# Patient Record
Sex: FEMALE | Race: ASIAN | URBAN - METROPOLITAN AREA
[De-identification: names, ages, dates, MRNs, and addresses within clinical notes are randomized per-mention and may not be internally consistent; named-entity substitution may affect disease eponyms.]

---

## 2023-04-05 ENCOUNTER — OFFICE VISIT (OUTPATIENT)
Facility: LOCATION | Age: 58
End: 2023-04-05
Payer: MEDICARE

## 2023-04-05 DIAGNOSIS — H16.223 KERATOCONJUNCT SICCA, NOT SPECIFIED AS SJOGREN'S, BILATERAL: ICD-10-CM

## 2023-04-05 DIAGNOSIS — H25.813 COMBINED FORMS OF AGE-RELATED CATARACT, BILATERAL: Primary | ICD-10-CM

## 2023-04-05 DIAGNOSIS — H04.123 DRY EYE SYNDROME OF BILATERAL LACRIMAL GLANDS: ICD-10-CM

## 2023-04-05 PROCEDURE — 92025 CPTRIZED CORNEAL TOPOGRAPHY: CPT | Performed by: OPHTHALMOLOGY

## 2023-04-05 PROCEDURE — 92136 OPHTHALMIC BIOMETRY: CPT | Performed by: OPHTHALMOLOGY

## 2023-04-05 PROCEDURE — 92134 CPTRZ OPH DX IMG PST SGM RTA: CPT | Performed by: OPHTHALMOLOGY

## 2023-04-05 PROCEDURE — 92015 DETERMINE REFRACTIVE STATE: CPT | Performed by: OPHTHALMOLOGY

## 2023-04-05 PROCEDURE — 99204 OFFICE O/P NEW MOD 45 MIN: CPT | Performed by: OPHTHALMOLOGY

## 2023-04-05 RX ORDER — PREDNISOLONE ACETATE 10 MG/ML
1 % SUSPENSION/ DROPS OPHTHALMIC
Qty: 10 | Refills: 2 | Status: INACTIVE
Start: 2023-04-05 | End: 2023-04-05

## 2023-04-05 RX ORDER — KETOROLAC TROMETHAMINE 5 MG/ML
0.5 % SOLUTION OPHTHALMIC
Qty: 10 | Refills: 2 | Status: ACTIVE
Start: 2023-04-05

## 2023-04-05 RX ORDER — OFLOXACIN 3 MG/ML
0.3 % SOLUTION/ DROPS OPHTHALMIC
Qty: 10 | Refills: 2 | Status: ACTIVE
Start: 2023-04-05

## 2023-04-05 ASSESSMENT — VISUAL ACUITY
OD: 20/25
OS: 20/25

## 2023-04-05 ASSESSMENT — INTRAOCULAR PRESSURE
OD: 16
OS: 16

## 2023-04-05 NOTE — IMPRESSION/PLAN
Impression: Keratoconjunct sicca, not specified as Sjogren's, bilateral: H45.797. Plan: RTC in 1-2 weeks for pre-op and ext x4 with Dr. Raina Agarwal.

## 2023-04-05 NOTE — IMPRESSION/PLAN
Impression: Combined forms of age-related cataract, bilateral: H25.813. OD > OS  Plan: MAC OCT, Orbscan, ARGOS, and OPD ordered today. Discussed option of trifocal IOL which has an extended depth of focus that allows for distance, intermediate, and near vision due to the concentric rings in the IOL. Patient aware of glares/halos caused by the trifocal lens due to the concentric circles in the IOL giving it the extended depth of focus. Discussed option of blended vision where the dominant eye would be geared for distance and the other eye geared for intermediate and together they would blend and give the patient a range of vision. Patient aware they will need glasses to drive at night and wear reading glasses. Explained to pt that if she gets the trifocal IOL and she is too bothered by the glares/halos it causes, she does have the option of having the IOL replaced. Pt expresses understanding. Discussed R/B/A of cataract surgery including risk of bleeding, infection, decreased BCVA, loss of eye. No guarantees, possible need  for further surgery. Patient expressed good understanding and wishes to proceed with CE/IOL OD. Hold off on OS until patient is ready to proceed. Explained to patient that cataract surgery is a procedure performed on the eye and any procedure of the eye is known to cause or aggravate ocular surface disease and dry eye issues. Explained to patient that this can be a lifelong concern even without cataract surgery and the cost of this is not covered by the cost of cataract surgery. Patient expresses understanding. Patient leaning between cataract surgery + blended vision + FEMTO OU vs. cataract surgery + Synergy/PanOptix trifocal + FEMTO OU. Pt to decide at pre-op. RTC in 1 month for cataract surgery OD with Dr. Aimee Wu.

## 2023-04-19 ENCOUNTER — OFFICE VISIT (OUTPATIENT)
Facility: LOCATION | Age: 58
End: 2023-04-19
Payer: COMMERCIAL

## 2023-04-19 DIAGNOSIS — H16.223 KERATOCONJUNCT SICCA, NOT SPECIFIED AS SJOGREN'S, BILATERAL: ICD-10-CM

## 2023-04-19 DIAGNOSIS — H25.813 COMBINED FORMS OF AGE-RELATED CATARACT, BILATERAL: ICD-10-CM

## 2023-04-19 DIAGNOSIS — H04.123 DRY EYE SYNDROME OF BILATERAL LACRIMAL GLANDS: Primary | ICD-10-CM

## 2023-04-19 ASSESSMENT — INTRAOCULAR PRESSURE
OS: 16
OD: 15

## 2023-04-19 NOTE — IMPRESSION/PLAN
Impression: Combined forms of age-related cataract, bilateral: H25.813. Plan: Discussed R/B/A of cataract surgery including risk of bleeding, infection, decreased BCVA, loss of eye. No guarantees, possible need  for further surgery. Patient expressed good understanding and wishes to proceed with CE/IOL OD Patient wears contact lenses daily, advised pt to stay out of her contact lenses for 2 weeks to repeat Amber Render, [address] [facility] [place]. Dr. Moises Lim to review measurements. Patient is leaning towards femto laser OD Goal: [facility] RTC 2 week repeat Amber Render, [address] [place], re-evaluate with Dr. Moises Lim
Impression: Dry eye syndrome of bilateral lacrimal glands: H04.123. Placed extended plugs x 4 today. Plan: Continue Refresh Plus 6 x day.
Impression: Keratoconjunct sicca, not specified as Sjogren's, bilateral: Q18.467. Plan: see dry eye plan.
Detail Level: Detailed
Patient is having excised by Diana Deras PA-C on 9/19/2018.

## 2023-05-03 ENCOUNTER — OFFICE VISIT (OUTPATIENT)
Facility: LOCATION | Age: 58
End: 2023-05-03
Payer: COMMERCIAL

## 2023-05-03 DIAGNOSIS — H25.813 COMBINED FORMS OF AGE-RELATED CATARACT, BILATERAL: Primary | ICD-10-CM

## 2023-05-03 PROCEDURE — 92025 CPTRIZED CORNEAL TOPOGRAPHY: CPT | Performed by: OPHTHALMOLOGY

## 2023-05-03 PROCEDURE — 76519 ECHO EXAM OF EYE: CPT | Performed by: OPHTHALMOLOGY

## 2023-05-03 PROCEDURE — 99214 OFFICE O/P EST MOD 30 MIN: CPT | Performed by: OPHTHALMOLOGY

## 2023-05-03 ASSESSMENT — PACHYMETRY
OS: 3.01
OD: 3.02
OD: 23.63
OS: 23.29

## 2023-05-03 ASSESSMENT — INTRAOCULAR PRESSURE
OS: 15
OD: 17

## 2023-05-03 NOTE — IMPRESSION/PLAN
Impression: Combined forms of age-related cataract, bilateral: H25.813. Plan: Discussed R/B/A of cataract surgery including risk of bleeding, infection, decreased BCVA, loss of eye. No guarantees, possible need  for further surgery. Patient expressed good understanding and wishes to proceed with CE/IOL OD. Patient has elected FEMTO/LRI OD. No guarantees were given. Patient understands. Lens picked: Syble Patch +21.50 Goal: [facility] RTC for surgery.

## 2023-05-05 ENCOUNTER — POST-OPERATIVE VISIT (OUTPATIENT)
Facility: LOCATION | Age: 58
End: 2023-05-05
Payer: COMMERCIAL

## 2023-05-05 DIAGNOSIS — Z48.810 ENCOUNTER FOR SURGICAL AFTERCARE FOLLOWING SURGERY ON A SENSE ORGAN: Primary | ICD-10-CM

## 2023-05-05 PROCEDURE — 99024 POSTOP FOLLOW-UP VISIT: CPT | Performed by: OPHTHALMOLOGY

## 2023-05-05 ASSESSMENT — INTRAOCULAR PRESSURE: OS: 14

## 2023-05-05 NOTE — IMPRESSION/PLAN
Impression: S/P Cataract Extraction by phacoemulsification with IOL placement; Femto (LenSx) OD - . Encounter for surgical aftercare following surgery on a sense organ  Z48.810.  Plan: Continue Ofloxacin, Pred QID and PFATs Q2 hours WA
RTC 1 week for IOP check, MR, AR and re-evaluate with Dr. Anil Schuler

## 2023-05-10 ENCOUNTER — POST-OPERATIVE VISIT (OUTPATIENT)
Facility: LOCATION | Age: 58
End: 2023-05-10
Payer: COMMERCIAL

## 2023-05-10 DIAGNOSIS — Z96.1 PRESENCE OF INTRAOCULAR LENS: Primary | ICD-10-CM

## 2023-05-10 PROCEDURE — 99024 POSTOP FOLLOW-UP VISIT: CPT | Performed by: OPHTHALMOLOGY

## 2023-05-10 ASSESSMENT — INTRAOCULAR PRESSURE
OS: 16
OD: 15

## 2023-05-10 ASSESSMENT — VISUAL ACUITY: OD: 20/20

## 2023-05-10 NOTE — IMPRESSION/PLAN
Impression: S/P Cataract Extraction by phacoemulsification with IO OD - 5 Days. Presence of intraocular lens  Z96.1. Plan: Stop Ofloxacin. Start tapering Prednisolone TID x 1 week then BID x 1 week then QD x 1 week then stop. Continue PFATs Q2H OU. RTC 1 month - check VA, Ks, MR, IOP and re-evaluation of operated eye. Obtain MAC OCT OU if VA is 20/30 or worse. All restrictions discontinued. Patient able to resume all normal activities. Pt to see Dr. Christina Page 1-2 weeks after for continued post-operative care and annual eye health and vision exams. RTC PRN decrease VA, increase pain, redness, discharge, photophobia, flashes/floaters, or other vision problems.

## 2023-06-07 ENCOUNTER — POST-OPERATIVE VISIT (OUTPATIENT)
Facility: LOCATION | Age: 58
End: 2023-06-07
Payer: COMMERCIAL

## 2023-06-07 DIAGNOSIS — Z48.810 ENCOUNTER FOR SURGICAL AFTERCARE FOLLOWING SURGERY ON A SENSE ORGAN: Primary | ICD-10-CM

## 2023-06-07 DIAGNOSIS — H52.13 MYOPIA, BILATERAL: ICD-10-CM

## 2023-06-07 PROCEDURE — 92015 DETERMINE REFRACTIVE STATE: CPT | Performed by: OPHTHALMOLOGY

## 2023-06-07 PROCEDURE — 99024 POSTOP FOLLOW-UP VISIT: CPT | Performed by: OPHTHALMOLOGY

## 2023-06-07 ASSESSMENT — INTRAOCULAR PRESSURE: OD: 17

## 2023-06-07 NOTE — IMPRESSION/PLAN
Impression: S/P Cataract Extraction by phacoemulsification with IOL placement; Femto (LenSx) OD - 33 Days. Encounter for surgical aftercare following surgery on a sense organ  Z48.810. Plan: Doing well. Patient to see Vandana Bautista O.D.  for eye exam for new glasses and or  contact lenses RTC 3-4 months for [facility] OU with Dr. Siobhan Kuhn

## 2023-10-11 ENCOUNTER — OFFICE VISIT (OUTPATIENT)
Facility: LOCATION | Age: 58
End: 2023-10-11
Payer: COMMERCIAL

## 2023-10-11 DIAGNOSIS — H25.812 COMBINED FORMS OF AGE-RELATED CATARACT, LEFT EYE: ICD-10-CM

## 2023-10-11 DIAGNOSIS — H16.223 KERATOCONJUNCT SICCA, NOT SPECIFIED AS SJOGREN'S, BILATERAL: ICD-10-CM

## 2023-10-11 DIAGNOSIS — H04.123 DRY EYE SYNDROME OF BILATERAL LACRIMAL GLANDS: Primary | ICD-10-CM

## 2023-10-11 PROCEDURE — 99214 OFFICE O/P EST MOD 30 MIN: CPT | Performed by: OPHTHALMOLOGY

## 2023-10-11 ASSESSMENT — INTRAOCULAR PRESSURE
OS: 14
OD: 13